# Patient Record
Sex: FEMALE | Race: BLACK OR AFRICAN AMERICAN | NOT HISPANIC OR LATINO | Employment: OTHER | URBAN - METROPOLITAN AREA
[De-identification: names, ages, dates, MRNs, and addresses within clinical notes are randomized per-mention and may not be internally consistent; named-entity substitution may affect disease eponyms.]

---

## 2018-05-13 ENCOUNTER — HOSPITAL ENCOUNTER (EMERGENCY)
Facility: HOSPITAL | Age: 83
Discharge: HOME | End: 2018-05-13
Attending: EMERGENCY MEDICINE
Payer: MEDICARE

## 2018-05-13 VITALS
SYSTOLIC BLOOD PRESSURE: 151 MMHG | HEART RATE: 76 BPM | TEMPERATURE: 98 F | OXYGEN SATURATION: 98 % | DIASTOLIC BLOOD PRESSURE: 72 MMHG | RESPIRATION RATE: 18 BRPM

## 2018-05-13 DIAGNOSIS — R40.20 LOSS OF CONSCIOUSNESS: ICD-10-CM

## 2018-05-13 DIAGNOSIS — R55 NEAR SYNCOPE: Primary | ICD-10-CM

## 2018-05-13 LAB
ALBUMIN SERPL BCP-MCNC: 3 G/DL
ALP SERPL-CCNC: 55 U/L
ALT SERPL W/O P-5'-P-CCNC: 12 U/L
ANION GAP SERPL CALC-SCNC: 9 MMOL/L
AST SERPL-CCNC: 16 U/L
BASOPHILS # BLD AUTO: 0.04 K/UL
BASOPHILS NFR BLD: 0.5 %
BILIRUB SERPL-MCNC: 0.4 MG/DL
BNP SERPL-MCNC: 58 PG/ML
BUN SERPL-MCNC: 21 MG/DL
CALCIUM SERPL-MCNC: 8.4 MG/DL
CHLORIDE SERPL-SCNC: 113 MMOL/L
CO2 SERPL-SCNC: 22 MMOL/L
CREAT SERPL-MCNC: 0.8 MG/DL
DIFFERENTIAL METHOD: ABNORMAL
EOSINOPHIL # BLD AUTO: 0.1 K/UL
EOSINOPHIL NFR BLD: 0.9 %
ERYTHROCYTE [DISTWIDTH] IN BLOOD BY AUTOMATED COUNT: 16.1 %
EST. GFR  (AFRICAN AMERICAN): >60 ML/MIN/1.73 M^2
EST. GFR  (NON AFRICAN AMERICAN): >60 ML/MIN/1.73 M^2
GLUCOSE SERPL-MCNC: 90 MG/DL
HCT VFR BLD AUTO: 29.6 %
HGB BLD-MCNC: 8.9 G/DL
IMM GRANULOCYTES # BLD AUTO: 0.03 K/UL
IMM GRANULOCYTES NFR BLD AUTO: 0.4 %
LYMPHOCYTES # BLD AUTO: 1.2 K/UL
LYMPHOCYTES NFR BLD: 15.6 %
MCH RBC QN AUTO: 24.9 PG
MCHC RBC AUTO-ENTMCNC: 30.1 G/DL
MCV RBC AUTO: 83 FL
MONOCYTES # BLD AUTO: 0.6 K/UL
MONOCYTES NFR BLD: 8 %
NEUTROPHILS # BLD AUTO: 5.6 K/UL
NEUTROPHILS NFR BLD: 74.6 %
NRBC BLD-RTO: 0 /100 WBC
PLATELET # BLD AUTO: 302 K/UL
PMV BLD AUTO: 10.8 FL
POTASSIUM SERPL-SCNC: 3.5 MMOL/L
PROT SERPL-MCNC: 6.3 G/DL
RBC # BLD AUTO: 3.57 M/UL
SODIUM SERPL-SCNC: 144 MMOL/L
TROPONIN I SERPL DL<=0.01 NG/ML-MCNC: <0.006 NG/ML
TROPONIN I SERPL DL<=0.01 NG/ML-MCNC: <0.006 NG/ML
WBC # BLD AUTO: 7.52 K/UL

## 2018-05-13 PROCEDURE — 80053 COMPREHEN METABOLIC PANEL: CPT

## 2018-05-13 PROCEDURE — 94761 N-INVAS EAR/PLS OXIMETRY MLT: CPT

## 2018-05-13 PROCEDURE — 85025 COMPLETE CBC W/AUTO DIFF WBC: CPT

## 2018-05-13 PROCEDURE — 83880 ASSAY OF NATRIURETIC PEPTIDE: CPT

## 2018-05-13 PROCEDURE — 93005 ELECTROCARDIOGRAM TRACING: CPT

## 2018-05-13 PROCEDURE — 93010 ELECTROCARDIOGRAM REPORT: CPT | Mod: ,,, | Performed by: INTERNAL MEDICINE

## 2018-05-13 PROCEDURE — 99284 EMERGENCY DEPT VISIT MOD MDM: CPT | Mod: 25

## 2018-05-13 PROCEDURE — 84484 ASSAY OF TROPONIN QUANT: CPT | Mod: 91

## 2018-05-13 RX ORDER — NITROGLYCERIN 0.4 MG/1
0.4 TABLET SUBLINGUAL EVERY 5 MIN PRN
COMMUNITY

## 2018-05-13 RX ORDER — ALBUTEROL SULFATE 90 UG/1
2 AEROSOL, METERED RESPIRATORY (INHALATION) EVERY 6 HOURS PRN
COMMUNITY

## 2018-05-13 NOTE — ED TRIAGE NOTES
"Patient received with complaint of SOB without relief from proair, so she took NTG.  Patient without LOC but got weak and dizzy.  Patient laid down on top of a car and within 3 minutes went from weak and "listless" to alert, oriented x 4 and active.    No LDA's in place on arrival to department.    Family/daughter present.    Pain: Never had pain.      Psychosocial:  Patient is calm and cooperative.  Patients insight and judgement are appropriate to situation.  Appears clean, well maintained, with clothing appropriate to environment.  No evidence of delusions, hallucinations, or psychosis.    Neuro:  Eyes open spontaneously.  Awake, alert, oriented x 4.  Speech clear and appropriate.  Tolerating saliva secretions well.  Able to follow commands, demonstrating ability to actively and appropriately communicate within context of current conversation.  Symmetrical facial muscles.  Moving all extremities well with no noted weakness.  Adequate muscle tone present.    Movement is purposeful.  No evidence of impaired sensation.  Responds to external stimuli with appropriate reflexes.  Pupils equally round and reactive to light.  No noted drifts.      Airway:  Bilateral chest rise and fall.  RR regular and non-labored.  No crepitus or subcutaneous emphysema noted on palpation.      Circulatory:  Skin warm, dry, and pink.  Apical and radial pulses strong and regular.  Capillary refill/skin blanching less than 3 seconds to distal of 4 extremities.    Abdomen:  Abdomen soft and non-distended.        Urinary:  Patient reports routine urination without pain, frequency, or urgency.  Voids independently.  Reports urine appears mckayla/yellow in color.    Extremities:  No redness, heat, swelling, deformity, or pain.    Skin:  Intact with no bruising/discolorations noted.        "

## 2018-05-13 NOTE — DISCHARGE INSTRUCTIONS
Please follow up with primary care doctor in [place] THIS WEEK. Take it easy while you are in [place]. Advise staying inside as much as possible.     Our goal in the emergency department is to always give you outstanding care and exceptional service. You may receive a survey by mail or e-mail in the next week regarding your experience in our ED. We would greatly appreciate your completing and returning the survey. Your feedback provides us with a way to recognize our staff who give very good care and it helps us learn how to improve when your experience was below our aspiration of excellence.

## 2018-05-13 NOTE — ED PROVIDER NOTES
Encounter Date: 5/13/2018    SCRIBE #1 NOTE: I, Harvey Escobar, am scribing for, and in the presence of,  Dr. Wallace. I have scribed the following portions of the note - the APC attestation and the EKG reading.       History     Chief Complaint   Patient presents with    Loss of Consciousness     Pt had one episode of syncope after walking around the  all morning. Pt is awake, alert and oriented x3 upon arrival. Pt was hypotensive upon EMS arrival.      82 y.o. female with medical history of asthma presents to the ED for near syncope. Patient had MENENDEZ while walking through the [facility]. Prescribed inhaler for asthma so tried with minimal relief then took nitro and had near syncope episode. Unclear from patient and daughter why she is prescribed nitro, she has no hx of cardiac disease. Patient is currently asymptomatic.          Review of patient's allergies indicates:  No Known Allergies  Past Medical History:   Diagnosis Date    Asthma      History reviewed. No pertinent surgical history.  History reviewed. No pertinent family history.  Social History   Substance Use Topics    Smoking status: Never Smoker    Smokeless tobacco: Never Used    Alcohol use No     Review of Systems   Constitutional: Negative for chills, diaphoresis, fatigue and fever.   HENT: Negative for congestion and sore throat.    Respiratory: Positive for shortness of breath. Negative for cough.    Cardiovascular: Negative for chest pain and palpitations.   Gastrointestinal: Negative for abdominal pain, nausea and vomiting.   Genitourinary: Negative for dysuria.   Musculoskeletal: Negative for back pain and myalgias.   Skin: Negative for rash.   Neurological: Positive for light-headedness. Negative for syncope, weakness, numbness and headaches.   Hematological: Does not bruise/bleed easily.   Psychiatric/Behavioral: The patient is not nervous/anxious.        Physical Exam     Initial Vitals [05/13/18 1429]   BP Pulse Resp Temp SpO2    136/72 90 16 97.9 °F (36.6 °C) 97 %      MAP       93.33         Physical Exam    Vitals reviewed.  Constitutional: Vital signs are normal. She appears well-developed and well-nourished. She is not diaphoretic. No distress.   HENT:   Head: Normocephalic and atraumatic.   Nose: Nose normal.   Mouth/Throat: Oropharynx is clear and moist.   Eyes: Conjunctivae, EOM and lids are normal. Pupils are equal, round, and reactive to light. Lids are everted and swept, no foreign bodies found. Right eye exhibits no discharge. Left eye exhibits no discharge.   Neck: Trachea normal and normal range of motion. Neck supple.   Cardiovascular: Normal rate, intact distal pulses and normal pulses.   Pulmonary/Chest: She has no wheezes. She has no rhonchi. She has no rales. She exhibits no tenderness.   Abdominal: Soft. Normal appearance and bowel sounds are normal. There is no tenderness. There is no rebound.   Genitourinary: Rectum normal. Rectal exam shows no external hemorrhoid, no internal hemorrhoid, anal tone normal and guaiac negative stool. Guaiac negative stool. : Acceptable.  Genitourinary Comments: Light brown heme negative stool   Musculoskeletal: She exhibits no edema or tenderness.   Lymphadenopathy:     She has no cervical adenopathy.   Neurological: She is alert and oriented to person, place, and time. She has normal strength. No cranial nerve deficit or sensory deficit.   No pronator drift. Cerebellar motions intact   Skin: Skin is warm. Capillary refill takes less than 2 seconds. No rash noted. No cyanosis.   Psychiatric: She has a normal mood and affect.         ED Course   Procedures  Labs Reviewed   CBC W/ AUTO DIFFERENTIAL - Abnormal; Notable for the following:        Result Value    RBC 3.57 (*)     Hemoglobin 8.9 (*)     Hematocrit 29.6 (*)     MCH 24.9 (*)     MCHC 30.1 (*)     RDW 16.1 (*)     Gran% 74.6 (*)     Lymph% 15.6 (*)     All other components within normal limits   COMPREHENSIVE  METABOLIC PANEL - Abnormal; Notable for the following:     Chloride 113 (*)     CO2 22 (*)     Calcium 8.4 (*)     Albumin 3.0 (*)     All other components within normal limits   TROPONIN I   B-TYPE NATRIURETIC PEPTIDE   TROPONIN I     EKG Readings: (Independently Interpreted)   Sinus rhythm, rate 90, no acute ST elevation or depression, adequate tracing           Medical Decision Making:   History:   Old Medical Records: I decided to obtain old medical records.  Old Records Summarized: records from clinic visits.  Initial Assessment:   82 y.o. female with medical history of asthma presents to the ED for near syncope. Was MENEN[place]Z while walking through [place]. Tried inhaler with no relief then took a nitro. Unsure why patient has nitro with no hx of cardiac disease. Had near syncope after taking nitro. Currently asymptomatic.  Differential Diagnosis:   DDX includes but is not limited to ACS, arrhythmia, vasovagal, anemia, asthma, pneumonia. Considered but least likely suspect PE.  Independently Interpreted Test(s):   I have ordered and independently interpreted EKG Reading(s) - see prior notes  Clinical Tests:   Lab Tests: Ordered and Reviewed  Radiological Study: Ordered and Reviewed  Medical Tests: Ordered and Reviewed  ED Management:  Will get labs, EKG.     H/H is 8.9/29.6, unknown what baseline is. Denies vaginal bleeding, black stool. Trop negative. CXR shows diffuse interstitial markings most likely related to chronic fibrotic changes. Stool is light, brown heme negative. 2nd troponin is negative. Least likely suspect ischemic disease. shortness of breath most likely due to anemia. Dr. Wallace and I had in depth discussion with patient and her daughter regarding labs and dispo plan. Offered admission but they declined. Patient and daughter are ok with resting and following up with PCP this week. Strict return precautions given. Discharged to home in stable condition, return to ED warnings given, follow up  "and patient care instructions given.      I have discussed the treatment and management of this patient with my supervisory physician, and we agree on the plan of care.               Scribe Attestation:   Scribe #1: I performed the above scribed service and the documentation accurately describes the services I performed. I attest to the accuracy of the note.    Attending Attestation:     Physician Attestation Statement for NP/PA:   I have conducted a face to face encounter with this patient in addition to the NP/PA, due to Medical Complexity    Other NP/PA Attestation Additions:    History of Present Illness: The patient is a 82 y.o. female with co-morbidities including: asthma who presents to the ED with a complaint of shortness of breath, which began while walking earlier today. States shortness of breath is consistent with typical asthma exacerbations, which she has frequently. She used her inhaler at that time without symptom relief. Pt then took NTG, which she states she was instructed to do by her physician in instances where her inhaler did not provide symptom relief. 4-5 minutes after taking NTG she became lightheaded and had a near syncopal event. Denies any loss of consciousness. No chest pain, palpitations, diaphoresis, nausea, vomiting. No history of HTN. She states she took NTG 1 day ago after similar shortness of breath. She did not become lightheaded at that time.     Medical Decision Making: Pt with dyspnea on exertion, but this is felt not to be inappropriate as the pt reports a history of "asthma" and that symptoms are typical for her baseline. Pt had a near syncope but this is explained due to the pt not having high blood pressure and taking NTG just before her symptoms. Her vital signs have normalized. Today's work up has been positive for a moderate anemia. Pt has been told in the past that she was anemic but unfortunately we have no baseline to . Pt has been offered admission due to her " age and co-morbidities. Awaiting return of daughter to discuss risk of discharge vs admission. I have explained to family that if they choose outpatient care it would not be without risk due to the pt's age and symptoms.                     Clinical Impression:   The primary encounter diagnosis was Near syncope. A diagnosis of Loss of consciousness was also pertinent to this visit.    Disposition:   Disposition: Discharged  Condition: Stable                        Prabha Bailey PA-C  05/13/18 1916

## 2018-05-13 NOTE — ED NOTES
Patient resting in stretcher and is in no acute distress at this time. Patient is awake and alert, and oriented to person, time, place, and situation. Respirations even and unlabored. Patient updated on plan of care and voices no needs at this time. Stretcher low and locked with both side rails up and call bell within patient's reach.  MD awaiting daughter's return to discuss plan of care.